# Patient Record
Sex: FEMALE
[De-identification: names, ages, dates, MRNs, and addresses within clinical notes are randomized per-mention and may not be internally consistent; named-entity substitution may affect disease eponyms.]

---

## 2021-06-04 ENCOUNTER — HOSPITAL ENCOUNTER (EMERGENCY)
Dept: HOSPITAL 46 - ED | Age: 26
LOS: 1 days | Discharge: HOME | End: 2021-06-05
Payer: SELF-PAY

## 2021-06-04 VITALS — BODY MASS INDEX: 34.82 KG/M2 | HEIGHT: 65 IN | WEIGHT: 209 LBS

## 2021-06-04 DIAGNOSIS — O21.9: Primary | ICD-10-CM

## 2021-06-04 DIAGNOSIS — Z3A.11: ICD-10-CM

## 2021-12-16 ENCOUNTER — HOSPITAL ENCOUNTER (INPATIENT)
Dept: HOSPITAL 46 - FBC | Age: 26
LOS: 2 days | Discharge: HOME | End: 2021-12-18
Attending: OBSTETRICS & GYNECOLOGY | Admitting: OBSTETRICS & GYNECOLOGY
Payer: COMMERCIAL

## 2021-12-16 VITALS — HEIGHT: 65 IN | BODY MASS INDEX: 38.16 KG/M2 | WEIGHT: 229.06 LBS

## 2021-12-16 DIAGNOSIS — O26.873: ICD-10-CM

## 2021-12-16 DIAGNOSIS — Z3A.36: ICD-10-CM

## 2021-12-16 DIAGNOSIS — D64.9: ICD-10-CM

## 2021-12-16 DIAGNOSIS — O26.893: ICD-10-CM

## 2021-12-16 DIAGNOSIS — R79.89: ICD-10-CM

## 2021-12-16 PROCEDURE — 00HU33Z INSERTION OF INFUSION DEVICE INTO SPINAL CANAL, PERCUTANEOUS APPROACH: ICD-10-PCS | Performed by: OBSTETRICS & GYNECOLOGY

## 2021-12-16 PROCEDURE — 10H07YZ INSERTION OF OTHER DEVICE INTO PRODUCTS OF CONCEPTION, VIA NATURAL OR ARTIFICIAL OPENING: ICD-10-PCS | Performed by: OBSTETRICS & GYNECOLOGY

## 2021-12-16 PROCEDURE — A9270 NON-COVERED ITEM OR SERVICE: HCPCS

## 2021-12-16 PROCEDURE — U0003 INFECTIOUS AGENT DETECTION BY NUCLEIC ACID (DNA OR RNA); SEVERE ACUTE RESPIRATORY SYNDROME CORONAVIRUS 2 (SARS-COV-2) (CORONAVIRUS DISEASE [COVID-19]), AMPLIFIED PROBE TECHNIQUE, MAKING USE OF HIGH THROUGHPUT TECHNOLOGIES AS DESCRIBED BY CMS-2020-01-R: HCPCS

## 2021-12-16 PROCEDURE — 3E0R3BZ INTRODUCTION OF ANESTHETIC AGENT INTO SPINAL CANAL, PERCUTANEOUS APPROACH: ICD-10-PCS | Performed by: OBSTETRICS & GYNECOLOGY

## 2021-12-16 PROCEDURE — 10907ZC DRAINAGE OF AMNIOTIC FLUID, THERAPEUTIC FROM PRODUCTS OF CONCEPTION, VIA NATURAL OR ARTIFICIAL OPENING: ICD-10-PCS | Performed by: OBSTETRICS & GYNECOLOGY

## 2021-12-16 NOTE — NUR
Both nares swabbed for Covid-19 without complication.
Rapid PCR sample taken to [facility] lab

## 2021-12-16 NOTE — PR
[facility]
                                    [address]
                                  [place], [place]  [address]
_________________________________________________________________________________________
                                                                 Signed   
 
 
===================================
Progress Notes IP
===================================
Datetime Report Generated by CPN: 12/16/2021 18:08
   
PROGRESS NOTES:  [id number]
Other Impressions:  slow progress
Procedures:  Intrauterine Pressure Catheter; Sterile Vag Exam
Plan:  Augmentation
VITAL SIGNS:  [id number]
Vital Signs:  Reviewed; Within Normal Limits
EXAM:  [id number]
Dilatation:  7.0
Effacement:  100
Station:  1
Contractions:  irreg
MEMBRANES:  [id number]
Comments:  Minimal change since admit today.  Contractions appear inadequate with IUPC. 
   Will begin pit augment.  
FETUS A:  [id number]
FHR Baseline:  140
Variability:  Moderate 6-25bpm
Accelerations:  15X15
Decelerations:  None
FHR Category:  Category I
Presentation:  Vertex
Comments on Fetus A:  No evidence of fetal metabolic acidosis
FETUS B:  [id number]
Signing Physician:  Iqra Castellon MD
 
 
Copies:                                
~
 
 
 
 
 
 
 
 
 
 
*Electronically Signed*  12/16/21  1808  IQRA CASTELLON MD            
                                                                       
_________________________________________________________________________________________
PATIENT NAME:     ALEGRIAGENARO SCHMIDT                  
MEDICAL RECORD #: [id number]                     PROGRESS NOTE                 
          ACCT #: [id number]  
DATE OF BIRTH:   [birth year]                                       
PHYSICIAN:   IQRA CASTELLON MD                   RPT #: [id number]
REPORT IS CONFIDENTIAL AND NOT TO BE RELEASED WITHOUT AUTHORIZATION

## 2021-12-17 NOTE — PR
[facility]
                                    [address] [place], [place]  [address]
_________________________________________________________________________________________
                                                                 Signed   
 
 
===================================
PP Progress Notes
===================================
Datetime Report Generated by CPN: 2021 09:07
   
SUBJECTIVE:  [id number]
Pain:  Within Normal Limits
Vital Signs:  [id number]
Vital Signs:  Reviewed
Notable Details:  mild HTN
Cardiovascular:  Not Done
Respiratory:  Not Done
Abdomen/Uterus:  Abnormal
Lochia:  Normal
Vulva/Perineum:  Not Done
Breasts:  Not Done
CVA Tenderness:  Not Done
Extremities:  Normal
 Incision:  Not Applicable
Breastfeeding Progress:  Abnormal
Exam Comments:  Fundus firm, NT @ U-2.
      
   H/H 10.1/30.6, WBC 18, plat 191k
   AST 60 (49 yest)
    (89 yest)
IMPRESSION/PLAN/PROCEDURES:  [id number]
Impression:  Breastfeeding Difficulties; Pregnancy Induced Hypertension
Other Impression:  elevated LFTs, anemia
Plan:  Breastfeeding Consult
Progress Notes:  Doing well overall.  She is tolerating the anemia and no evidence of any
   excess bleeding.  LFTs are a little bit higher but otherwise she is asymptomatic.
Signing Physician:  Iqra Castellon MD
 
 
Copies:                                
~
 
 
 
 
 
 
 
*Electronically Signed*  21  IQRA CASTELLON MD            
                                                                       
_________________________________________________________________________________________
PATIENT NAME:     GENARO ALEGRIA                  
MEDICAL RECORD #: [id number]                     PROGRESS NOTE                 
          ACCT #: [id number]  
DATE OF BIRTH:   [birth year]                                       
PHYSICIAN:   IQRA CASTELLON MD                   RPT #: [id number]
REPORT IS CONFIDENTIAL AND NOT TO BE RELEASED WITHOUT AUTHORIZATION

## 2021-12-18 NOTE — PR
[facility]
                                    [address]
                                  [place], [place]  [address]
_________________________________________________________________________________________
                                                                 Signed   
 
 
===================================
PP Progress Notes
===================================
Datetime Report Generated by CPN: 2021 09:15
   
SUBJECTIVE:  [id number]
Pain:  Within Normal Limits
Vital Signs:  [id number]
Vital Signs:  Reviewed; Within Normal Limits
Notable Details:  mild HTN
Cardiovascular:  Not Done
Respiratory:  Not Done
Abdomen/Uterus:  Abnormal
Lochia:  Normal
Vulva/Perineum:  Not Done
Breasts:  Not Done
CVA Tenderness:  Not Done
Extremities:  Normal
 Incision:  Not Applicable
Breastfeeding Progress:  Normal
Exam Comments:  Fundus firm, NT @ U-1.
IMPRESSION/PLAN/PROCEDURES:  [id number]
Impression:  Normal Postpartum Progression
Other Impression:  elevated LFTs, anemia
Plan:  Discharge
Procedures:  None
Progress Notes:  Doing well.  She is ready for D/C.  Discussed possible boarder status if
   baby is not discharged.
Signing Physician:  Jaki Castellon MD
 
 
Copies:                                
~
 
 
 
 
 
 
 
 
 
 
*Electronically Signed*  21  JAKI CASTELLON MD            
                                                                       
_________________________________________________________________________________________
PATIENT NAME:     GENARO ALEGRIA BRAYAN                  
MEDICAL RECORD #: [id number]                     PROGRESS NOTE                 
          ACCT #: [id number]  
DATE OF BIRTH:   [birth year]                                       
PHYSICIAN:   JAKI CASTELLON MD                   RPT #: [id number]
REPORT IS CONFIDENTIAL AND NOT TO BE RELEASED WITHOUT AUTHORIZATION